# Patient Record
Sex: MALE | HISPANIC OR LATINO | URBAN - METROPOLITAN AREA
[De-identification: names, ages, dates, MRNs, and addresses within clinical notes are randomized per-mention and may not be internally consistent; named-entity substitution may affect disease eponyms.]

---

## 2020-01-13 ENCOUNTER — OFFICE VISIT (OUTPATIENT)
Dept: FAMILY MEDICINE CLINIC | Facility: CLINIC | Age: 20
End: 2020-01-13

## 2020-01-13 VITALS
RESPIRATION RATE: 16 BRPM | BODY MASS INDEX: 17.9 KG/M2 | SYSTOLIC BLOOD PRESSURE: 116 MMHG | DIASTOLIC BLOOD PRESSURE: 74 MMHG | OXYGEN SATURATION: 97 % | HEIGHT: 70 IN | TEMPERATURE: 97.6 F | WEIGHT: 125 LBS | HEART RATE: 96 BPM

## 2020-01-13 DIAGNOSIS — J10.1 INFLUENZA A: Primary | ICD-10-CM

## 2020-01-13 DIAGNOSIS — R68.89 FLU-LIKE SYMPTOMS: ICD-10-CM

## 2020-01-13 LAB
EXPIRATION DATE: 0
FLUAV AG NPH QL: POSITIVE
FLUBV AG NPH QL: NEGATIVE
INTERNAL CONTROL: ABNORMAL
Lab: 0

## 2020-01-13 PROCEDURE — 87804 INFLUENZA ASSAY W/OPTIC: CPT | Performed by: NURSE PRACTITIONER

## 2020-01-13 PROCEDURE — 99213 OFFICE O/P EST LOW 20 MIN: CPT | Performed by: NURSE PRACTITIONER

## 2020-01-13 RX ORDER — BROMPHENIRAMINE MALEATE, PSEUDOEPHEDRINE HYDROCHLORIDE, AND DEXTROMETHORPHAN HYDROBROMIDE 2; 30; 10 MG/5ML; MG/5ML; MG/5ML
10 SYRUP ORAL EVERY 6 HOURS PRN
Qty: 400 ML | Refills: 0 | Status: SHIPPED | OUTPATIENT
Start: 2020-01-13 | End: 2020-01-23

## 2020-01-13 RX ORDER — ALBUTEROL SULFATE 90 UG/1
2 AEROSOL, METERED RESPIRATORY (INHALATION) EVERY 6 HOURS PRN
Qty: 1 INHALER | Refills: 1 | Status: SHIPPED | OUTPATIENT
Start: 2020-01-13

## 2020-01-13 NOTE — PATIENT INSTRUCTIONS
Gripe en los adultos  Influenza, Adult  La gripe, también llamada “influenza”, es sarah infección viral que afecta, principalmente, las vías respiratorias. Las vías respiratorias incluyen órganos que ayudan a respirar, neyda los pulmones, la nariz y la garganta. La gripe provoca muchos síntomas similares a los del resfrío común, junto con fiebre earleen y dolor corporal.  Se transmite fácilmente de persona a persona (es contagiosa). La mejor manera de prevenir la gripe es aplicándose la vacuna contra la gripe todos los años.  ¿Cuáles son las causas?  La causa de esta afección es el virus de la influenza. Puede contraer el virus de las siguientes maneras:  · Al inhalar las gotitas que están en el aire liberadas por la tos o el estornudo de sarah persona infectada.  · Al tocar algo que estuvo expuesto al virus (fue contaminado) y luego tocarse la boca, nariz u ojos.  ¿Qué incrementa el riesgo?  Los siguientes factores pueden hacer que usted sea propenso a contraer la gripe:  · No lavarse o desinfectarse las marino con frecuencia.  · Tener contacto cercano con muchas personas jil la temporada de resfrío y gripe.  · Tocarse la boca, los ojos o la nariz sin antes lavarse ni desinfectarse las marino.  · No colocarse la vacuna anual contra la gripe.  Puede correr un mayor riesgo de tener gripe, incluso problemas graves neyda sarah infección pulmonar (neumonía), si:  · Es mayor de 65 años de edad.  · Está embarazada.  · Tiene debilitado el sistema que combate las enfermedades (sistema inmunitario). Puede tener un sistema inmunitario debilitado si:  ? Tienen VIH o síndrome de inmunodeficiencia adquirida (SIDA).  ? Está recibiendo quimioterapia.  ? Usa medicamentos que reducen (suprimen) la actividad de zamora sistema inmunitario.  · Tiene sarah enfermedad a ravinder plazo (crónica), neyda sarah enfermedad cardíaca, enfermedad renal, diabetes o enfermedad pulmonar.  · Tiene un trastorno hepático.  · Tiene mucho sobrepeso (obesidad  mórbida).  · Tiene anemia. Esta es sarah afección que afecta a los glóbulos rojos.  · Tiene asma.  ¿Cuáles son los signos o los síntomas?  Los síntomas de esta afección por lo general comienzan de repente y chew entre 4 y 14 días. Pueden incluir los siguientes:  · Fiebre y escalofríos.  · Raj de marija, raj en el cuerpo o raj musculares.  · Dolor de garganta.  · Tos.  · Secreción o congestión nasal.  · Malestar en el pecho.  · Falta de apetito.  · Debilidad o fatiga.  · Mareos.  · Náuseas o vómitos.  ¿Cómo se diagnostica?  Esta afección se puede diagnosticar en función de lo siguiente:  · Los síntomas y los antecedentes médicos.  · Un examen físico.  · Un hisopado de nariz o garganta y el análisis del líquido extraído para detectar el virus de la gripe.  ¿Cómo se trata?  Si la gripe se diagnostica de forma temprana, puede tratarse con medicamentos que pueden ayudar a reducir la gravedad de la enfermedad y reducir zamora duración (medicamentos antivirales). Estos pueden administrarse por boca (vía oral) o por vía intravenosa.  Cuidarse en zamora hogar también puede ayudar a aliviar los síntomas. El médico puede recomendarle lo siguiente:  · Tito medicamentos de venta gonzalo.  · Beber mucho líquido.  En muchos casos, la gripe desaparece clarke. Si tiene síntomas graves o complicaciones, puede tratarse en un hospital.  Siga estas indicaciones en zamora casa:  Actividad  · Descanse según sea necesario y duerma wm.  · Quédese en zamora casa y no concurra al trabajo o a la escuela neyda se lo haya indicado zamora médico. A menos que visite al médico, evite salir de zamora casa hasta que la fiebre haya desaparecido por 24 horas sin tito medicamentos.  Comida y bebida  · [place] sarah solución de rehidratación oral (oral rehydration solution, ORS). Esta es sarah bebida que se vende en farmacias y tiendas minoristas.  · Ghada suficiente líquido neyda para mantener la orina de color amarillo pálido.  · En la medida en que pueda, ghada líquidos  shaun en pequeñas cantidades. Sofia líquidos shaun, neyda agua, cubitos de hielo, jugos de fruta diluidos y bebidas deportivas bajas en calorías.  · En la medida en que pueda, consuma alimentos blandos y fáciles de digerir en pequeñas cantidades. Estos alimentos incluyen bananas, compota de manzana, arroz, wallace magras, tostadas y galletas saladas.  · Evite consumir líquidos que contengan mucha azúcar o cafeína, neyda bebidas energéticas, bebidas deportivas comunes y refrescos.  · Evite mina alcohol.  · Evite los alimentos condimentados o con alto contenido de grasa.  Indicaciones generales         · [place] los medicamentos de venta gonzalo y los recetados solamente neyda se lo haya indicado el médico.  · Use un humidificador de aire frío para agregar humedad al aire de zamora casa. [place] puede facilitar la respiración.  · Al toser o estornudar, cúbrase la boca y la nariz.  · Lávese las marino con agua y jabón frecuentemente, en especial después de toser o estornudar. Use desinfectante para marino con alcohol si no dispone de agua y jabón.  · Concurra a todas las visitas de control neyda se lo haya indicado el médico. [place] es importante.  ¿Cómo se chester?    · Colóquese la vacuna anual contra la gripe. Puede colocarse la vacuna contra la gripe a fines de verano, en otoño o en invierno. Pregúntele al médico cuándo debe colocarse la vacuna contra la gripe.  · Evite el contacto con personas que están enfermas jil la temporada de resfrío y gripe. Generalmente es jil el otoño y el invierno.  Comuníquese con un médico si:  · Tiene nuevos síntomas.  · Tiene los siguientes síntomas:  ? Dolor en el pecho.  ? Diarrea.  ? Fiebre.  · La tos empeora.  · Produce más mucosidad.  · Siente náuseas o vomita.  Solicite ayuda inmediatamente si:  · Le falta el aire o tiene dificultad para respirar.  · La piel o las uñas se tornan de un color azulado.  · Presenta dolor intenso o rigidez de felisa.  · Le duele la marija, la lobo o el oído de  forma repentina.  · No puede comer ni beber sin vomitar.  Resumen  · La gripe es sarah infección viral que afecta principalmente las vías respiratorias.  · Los síntomas de la gripe normalmente comienzan de repente y chew entre 4 y 14 días.  · Colocarse la vacuna anual contra la gripe es la mejor manera de prevenir el contagio de la gripe.  · Quédese en zamora casa y no concurra al trabajo o a la escuela neyda se lo haya indicado zamora médico. A menos que visite al médico, evite salir de zamora casa hasta que la fiebre haya desaparecido por 24 horas sin mina medicamentos.  · Concurra a todas las visitas de control neyda se lo haya indicado el médico. [place] es importante.  Esta información no tiene neyda fin reemplazar el consejo del médico. Asegúrese de hacerle al médico cualquier pregunta que tenga.  Document Released: 09/27/2006 Document Revised: 07/31/2019 Document Reviewed: 07/31/2019  Elsevier Interactive Patient Education © 2019 [employer] Inc[employer]

## 2020-01-13 NOTE — PROGRESS NOTES
Subjective   Rashi Pickett is a 19 y.o. male.   Chief Complaint   Patient presents with   • Influenza     weakness, bleeding up out of his nose, headaches, dry cough,chills sore throat in the morning      Influenza   This is a new problem. The current episode started yesterday. The problem occurs constantly. Associated symptoms include chills, congestion, coughing, fatigue, a fever, headaches, myalgias, a sore throat and swollen glands. Pertinent negatives include no abdominal pain, anorexia, arthralgias, change in bowel habit, chest pain, diaphoresis, joint swelling, nausea, neck pain, numbness, urinary symptoms or weakness. Nothing aggravates the symptoms. He has tried acetaminophen for the symptoms. The treatment provided mild relief.      Patient is here for complaints of flu like symptoms.   He speaks English. There is a certified  on staff.          The following portions of the patient's history were reviewed and updated as appropriate: allergies, current medications, past family history, past medical history, past social history, past surgical history and problem list.    Review of Systems   Constitutional: Positive for activity change, appetite change, chills, fatigue and fever. Negative for diaphoresis.   HENT: Positive for congestion, nosebleeds and sore throat.    Respiratory: Positive for cough.         Occasional wheeze   Cardiovascular: Negative.  Negative for chest pain.   Gastrointestinal: Negative.  Negative for abdominal pain, anorexia, change in bowel habit and nausea.   Musculoskeletal: Positive for myalgias. Negative for arthralgias, joint swelling and neck pain.   Allergic/Immunologic: Negative.    Neurological: Positive for headaches. Negative for weakness and numbness.   Hematological: Negative.    Psychiatric/Behavioral: Negative.      History reviewed. No pertinent surgical history.  History reviewed. No pertinent past medical history.    Objective   No Known Allergies  Visit  "Vitals  /74   Pulse 96   Temp 97.6 °F (36.4 °C)   Resp 16   Ht 177.8 cm (70\")   Wt 56.7 kg (125 lb)   SpO2 97%   BMI 17.94 kg/m²       Physical Exam   Constitutional: He is oriented to person, place, and time. He appears well-developed and well-nourished. He is cooperative. He appears ill.   HENT:   Head: Normocephalic.   Right Ear: Hearing, tympanic membrane, external ear and ear canal normal.   Left Ear: Hearing, tympanic membrane, external ear and ear canal normal.   Nose: Rhinorrhea present. Right sinus exhibits no maxillary sinus tenderness and no frontal sinus tenderness. Left sinus exhibits no maxillary sinus tenderness and no frontal sinus tenderness.   Mouth/Throat: Uvula is midline. Posterior oropharyngeal erythema present. Tonsils are 2+ on the right. Tonsils are 2+ on the left. No tonsillar exudate.   Eyes: Pupils are equal, round, and reactive to light.   Neck: No JVD present.   Cardiovascular: Normal rate, regular rhythm and normal heart sounds.   Pulmonary/Chest: Effort normal and breath sounds normal. He has no wheezes.   Abdominal: Soft.   Musculoskeletal: Normal range of motion.   Lymphadenopathy:     He has cervical adenopathy.   Neurological: He is alert and oriented to person, place, and time.   Skin: Skin is warm and dry. Capillary refill takes less than 2 seconds.   Psychiatric: He has a normal mood and affect. His behavior is normal.   Vitals reviewed.      Assessment/Plan   Rashi was seen today for influenza.    Diagnoses and all orders for this visit:    Influenza A  -     brompheniramine-pseudoephedrine-DM (BROMFED DM) 30-2-10 MG/5ML syrup; Take 10 mL by mouth Every 6 (Six) Hours As Needed for Congestion, Cough or Allergies for up to 10 days.    Flu-like symptoms  -     POCT Influenza A/B  -     albuterol sulfate  (90 Base) MCG/ACT inhaler; Inhale 2 puffs Every 6 (Six) Hours As Needed for Wheezing or Shortness of Air (or cough you cannot get under control).      POCT influenza " A/B: A Positive  B: Negative  Patient has been sick x 3 days - he is out of the window for xofluza and tamiflu.   Hx of asthma: will reorder inhaler.     Discussed adding humidifier to bedroom to help with the nose bleeds.     See influenza patient instructions.            Patient Instructions   Gripe en los adultos  Influenza, Adult  La gripe, también llamada “influenza”, es sarah infección viral que afecta, principalmente, las vías respiratorias. Las vías respiratorias incluyen órganos que ayudan a respirar, neyda los pulmones, la nariz y la garganta. La gripe provoca muchos síntomas similares a los del resfrío común, junto con fiebre earlene y dolor corporal.  Se transmite fácilmente de persona a persona (es contagiosa). La mejor manera de prevenir la gripe es aplicándose la vacuna contra la gripe todos los años.  ¿Cuáles son las causas?  La causa de esta afección es el virus de la influenza. Puede contraer el virus de las siguientes maneras:  · Al inhalar las gotitas que están en el aire liberadas por la tos o el estornudo de sarah persona infectada.  · Al tocar algo que estuvo expuesto al virus (fue contaminado) y luego tocarse la boca, nariz u ojos.  ¿Qué incrementa el riesgo?  Los siguientes factores pueden hacer que usted sea propenso a contraer la gripe:  · No lavarse o desinfectarse las marino con frecuencia.  · Tener contacto cercano con muchas personas jil la temporada de resfrío y gripe.  · Tocarse la boca, los ojos o la nariz sin antes lavarse ni desinfectarse las marino.  · No colocarse la vacuna anual contra la gripe.  Puede correr un mayor riesgo de tener gripe, incluso problemas graves neyda sarah infección pulmonar (neumonía), si:  · Es mayor de 65 años de edad.  · Está embarazada.  · Tiene debilitado el sistema que combate las enfermedades (sistema inmunitario). Puede tener un sistema inmunitario debilitado si:  ? Tienen VIH o síndrome de inmunodeficiencia adquirida (SIDA).  ? Está recibiendo  quimioterapia.  ? Usa medicamentos que reducen (suprimen) la actividad de zamora sistema inmunitario.  · Tiene sarah enfermedad a ravinder plazo (crónica), neyda sarah enfermedad cardíaca, enfermedad renal, diabetes o enfermedad pulmonar.  · Tiene un trastorno hepático.  · Tiene mucho sobrepeso (obesidad mórbida).  · Tiene anemia. Esta es sarah afección que afecta a los glóbulos rojos.  · Tiene asma.  ¿Cuáles son los signos o los síntomas?  Los síntomas de esta afección por lo general comienzan de repente y chew entre 4 y 14 días. Pueden incluir los siguientes:  · Fiebre y escalofríos.  · Raj de marija, raj en el cuerpo o raj musculares.  · Dolor de garganta.  · Tos.  · Secreción o congestión nasal.  · Malestar en el pecho.  · Falta de apetito.  · Debilidad o fatiga.  · Mareos.  · Náuseas o vómitos.  ¿Cómo se diagnostica?  Esta afección se puede diagnosticar en función de lo siguiente:  · Los síntomas y los antecedentes médicos.  · Un examen físico.  · Un hisopado de nariz o garganta y el análisis del líquido extraído para detectar el virus de la gripe.  ¿Cómo se trata?  Si la gripe se diagnostica de forma temprana, puede tratarse con medicamentos que pueden ayudar a reducir la gravedad de la enfermedad y reducir zamora duración (medicamentos antivirales). Estos pueden administrarse por boca (vía oral) o por vía intravenosa.  Cuidarse en zamora hogar también puede ayudar a aliviar los síntomas. El médico puede recomendarle lo siguiente:  · Tito medicamentos de venta gonzalo.  · Beber mucho líquido.  En muchos casos, la gripe desaparece clarke. Si tiene síntomas graves o complicaciones, puede tratarse en un hospital.  Siga estas indicaciones en zamora casa:  Actividad  · Descanse según sea necesario y duerma wm.  · Quédese en zamora casa y no concurra al trabajo o a la escuela neyda se lo haya indicado zamora médico. A menos que visite al médico, evite salir de zamora casa hasta que la fiebre haya desaparecido por 24 horas sin tito  medicamentos.  Comida y bebida  · [place] sarah solución de rehidratación oral (oral rehydration solution, ORS). Esta es sarah bebida que se vende en farmacias y tiendas minoristas.  · Sofia suficiente líquido neyda para mantener la orina de color amarillo pálido.  · En la medida en que pueda, sofia líquidos shaun en pequeñas cantidades. Sofia líquidos shaun, neyda agua, cubitos de hielo, jugos de fruta diluidos y bebidas deportivas bajas en calorías.  · En la medida en que pueda, consuma alimentos blandos y fáciles de digerir en pequeñas cantidades. Estos alimentos incluyen bananas, compota de manzana, arroz, wallace magras, tostadas y galletas saladas.  · Evite consumir líquidos que contengan mucha azúcar o cafeína, neyda bebidas energéticas, bebidas deportivas comunes y refrescos.  · Evite mina alcohol.  · Evite los alimentos condimentados o con alto contenido de grasa.  Indicaciones generales         · [place] los medicamentos de venta gonzalo y los recetados solamente neyda se lo haya indicado el médico.  · Use un humidificador de aire frío para agregar humedad al aire de zamora casa. [place] puede facilitar la respiración.  · Al toser o estornudar, cúbrase la boca y la nariz.  · Lávese las marino con agua y jabón frecuentemente, en especial después de toser o estornudar. Use desinfectante para marino con alcohol si no dispone de agua y jabón.  · Concurra a todas las visitas de control neyda se lo haya indicado el médico. [place] es importante.  ¿Cómo se chester?    · Colóquese la vacuna anual contra la gripe. Puede colocarse la vacuna contra la gripe a fines de verano, en otoño o en invierno. Pregúntele al médico cuándo debe colocarse la vacuna contra la gripe.  · Evite el contacto con personas que están enfermas jil la temporada de resfrío y gripe. Generalmente es jil el otoño y el invierno.  Comuníquese con un médico si:  · Tiene nuevos síntomas.  · Tiene los siguientes síntomas:  ? Dolor en el pecho.  ? Diarrea.  ? Fiebre.  · La  tos empeora.  · Produce más mucosidad.  · Siente náuseas o vomita.  Solicite ayuda inmediatamente si:  · Le falta el aire o tiene dificultad para respirar.  · La piel o las uñas se tornan de un color azulado.  · Presenta dolor intenso o rigidez de felisa.  · Le duele la marija, la lobo o el oído de forma repentina.  · No puede comer ni beber sin vomitar.  Resumen  · La gripe es sarah infección viral que afecta principalmente las vías respiratorias.  · Los síntomas de la gripe normalmente comienzan de repente y chew entre 4 y 14 días.  · Colocarse la vacuna anual contra la gripe es la mejor manera de prevenir el contagio de la gripe.  · Quédese en zamora casa y no concurra al trabajo o a la escuela neyda se lo haya indicado zamora médico. A menos que visite al médico, evite salir de zamora casa hasta que la fiebre haya desaparecido por 24 horas sin mina medicamentos.  · Concurra a todas las visitas de control neyda se lo haya indicado el médico. [place] es importante.  Esta información no tiene neyda fin reemplazar el consejo del médico. Asegúrese de hacerle al médico cualquier pregunta que tenga.  Document Released: 09/27/2006 Document Revised: 07/31/2019 Document Reviewed: 07/31/2019  Elsevier Interactive Patient Education © 2019 [employer] Inc.        Vashti Dowling, APRN

## 2021-03-05 ENCOUNTER — OFFICE VISIT (OUTPATIENT)
Dept: FAMILY MEDICINE CLINIC | Facility: CLINIC | Age: 21
End: 2021-03-05

## 2021-03-05 VITALS
WEIGHT: 141.6 LBS | TEMPERATURE: 98.7 F | BODY MASS INDEX: 21.46 KG/M2 | SYSTOLIC BLOOD PRESSURE: 94 MMHG | HEART RATE: 66 BPM | DIASTOLIC BLOOD PRESSURE: 60 MMHG | OXYGEN SATURATION: 98 % | HEIGHT: 68 IN

## 2021-03-05 DIAGNOSIS — G56.03 BILATERAL CARPAL TUNNEL SYNDROME: ICD-10-CM

## 2021-03-05 DIAGNOSIS — K29.70 GASTRITIS, PRESENCE OF BLEEDING UNSPECIFIED, UNSPECIFIED CHRONICITY, UNSPECIFIED GASTRITIS TYPE: ICD-10-CM

## 2021-03-05 DIAGNOSIS — Z00.00 WELL ADULT EXAM: Primary | ICD-10-CM

## 2021-03-05 PROCEDURE — 99385 PREV VISIT NEW AGE 18-39: CPT | Performed by: FAMILY MEDICINE

## 2021-03-05 NOTE — PATIENT INSTRUCTIONS
Cuidados preventivos en los hombres de 18 a 21 años de edad  Preventive Care 18-21 Years Old, Male  Los cuidados preventivos hacen referencia a las opciones en cuanto al estilo de jose raul y a las visitas al médico, las cuales pueden promover la adrienne y el bienestar. Ahora puede comenzar a consultar a un médico (de adultos) en lugar de un pediatra. Zamora atención médica ahora es zamora responsabilidad. Los cuidados preventivos en los adultos jóvenes incluyen:  · Un examen físico anual. [place] también se conoce neyda visita de control de bienestar anual.  · Exámenes dentales y oculares de manera regular.  · Vacunas.  · Estudios para detectar ciertas enfermedades.  · Opciones saludables de estilo de jose raul, neyda dieta y ejercicios.  ¿Qué puedo esperar para mi visita de cuidado preventivo?  Examen físico  El médico puede controlar lo siguiente:  · Estatura y peso. Estos datos se pueden usar para calcular el índice de masa corporal (IMC), sarah medición que indica si usted tiene un peso saludable.  · Frecuencia cardíaca y presión arterial.  · Temperatura corporal.  Asesoramiento  El médico puede hacerle preguntas sobre lo siguiente:  · Problemas médicos en el pasado y antecedentes médicos familiares.  · Consumo de tabaco, alcohol y drogas.  · Bienestar en el hogar y massimo relaciones personales.  · Acceso a hunter de ronald.  · Bienestar emocional.  · Hábitos de alimentación, ejercicio y sueño.  · Actividad sexual y adrienne sexual.  ¿Qué vacunas necesito?    Vacuna antigripal  · Se recomienda aplicarse esta vacuna todos los años.  Vacuna contra el tétanos, la difteria y la tos ferina (Tdap)  · Es posible que tenga que aplicarse un refuerzo contra el tétanos y la difteria (DT) cada 10 años.  Vacuna contra la varicela  · Es posible que tenga que aplicársela si aún no la recibió.  Vacuna contra el virus del papiloma humano (VPH)  · Si el médico se lo recomienda, puede necesitar elidia dosis a lo ravinder de 6 meses.  Vacuna contra el sarampión, la  angela y las paperas (SRP)  · Blake vez tenga que aplicarse por lo menos sarah dosis de la SRP. También es posible que necesite sarah segunda dosis.  Vacuna antimeningocócica conjugada (MenACWY)  · Se recomienda la aplicación de sarah dosis si tiene entre los 19 y los 21 años de edad, y es estudiante universitario de primer año que vive en sarah residencia estudiantil, o si tiene sarah de varias afecciones médicas. Podría también necesitar dosis de refuerzo.  Vacuna antineumocócica conjugada (PCV13)  · Puede necesitar esta vacuna si tiene determinadas enfermedades y no se vacunó anteriormente.  Vacuna antineumocócica de polisacáridos (PPSV23)  · Quizás tenga que aplicarse sarah o dos dosis si fuma o si tiene determinadas afecciones.  Vacuna contra la hepatitis A  · Es posible que necesite esta vacuna si tiene ciertas afecciones o si viaja o trabaja en lugares en los que podría estar expuesto a la hepatitis A.  Vacuna contra la hepatitis B  · Es posible que necesite esta vacuna si tiene ciertas afecciones o si viaja o trabaja en lugares en los que podría estar expuesto a la hepatitis B.  Vacuna antihaemophilus influenzae tipo B (Hib)  · Es posible que necesite esta vacuna si tiene algunos factores de riesgo.  Puede recibir las vacunas en forma de dosis individuales o en forma de dos o más vacunas juntas en la misma inyección (vacunas combinadas). Hable con zamora médico sobre los riesgos y beneficios de las vacunas combinadas.  ¿Qué pruebas necesito?  Análisis de james  · Niveles de lípidos y colesterol. Estos se pueden verificar cada 5 años, a partir de los 20 años de edad.  · Análisis de hepatitis C.  · Análisis de hepatitis B.  Pruebas de detección  · Examen de genitales para detectar cáncer testicular o hernias.  · Pruebas de enfermedades de transmisión sexual (ETS), si está en riesgo.  Otras pruebas  · Prueba cutánea de tuberculosis.  · Pruebas de la vista y la audición.  · Examen de la piel.  Siga estas instrucciones en zamora  casa:  Comida y bebida    · Siga sarah dieta que incluya frutas y verduras frescas, cereales integrales, proteínas magras y productos lácteos descremados.  · Sofia suficiente líquido neyda para mantener la orina de color amarillo pálido.  · No sofia alcohol si:  ? Zamora médico le indica no hacerlo.  ? No tiene la edad legal para beber. [place] los [place] la edad legal para beber es a partir de los 21 años.  · Si zulema alcohol:  ? Limite la cantidad que consume de 0 a 2 medidas por día.  ? Esté atento a la cantidad de alcohol que hay en las bebidas que javier. En los [place], sarha medida equivale a sarah botella de cerveza de 12 oz (355 ml), un vaso de vino de 5 oz (148 ml) o un vaso de sarah bebida alcohólica de earlene graduación de 1½ oz (44 ml).  Estilo de jose raul  · Cuídese los dientes y las encías a diario.  · Manténgase activo. Juan C al menos 30 minutos de ejercicio 5 o más días por semana.  · No consuma ningún producto que contenga nicotina o tabaco, neyda cigarrillos, cigarrillos electrónicos y tabaco de mascar. Si necesita ayuda para dejar de fumar, consulte al médico.  · No consuma drogas.  · Si es sexualmente activo, practique sexo seguro. Use un condón u otra forma de protección para prevenir las ITS (infecciones de transmisión sexual).  · Encuentre formas saludables de lidiar con el estrés tales neyda:  ? Meditación, yoga o escuchar música.  ? Lleve un diario personal.  ? Hable con sarah persona confiable.  ? Pase tiempo con amigos y familiares.  Seguridad  · Use siempre el cinturón de seguridad al conducir o viajar en un vehículo.  · No conduzca si estuvo bebiendo alcohol.  · No viaje con un conductor que ha estado bebiendo.  · No conduzca cuando esté cansado o distraído.  · No envíe mensajes de texto mientras conduce.  · Use un usha y otros equipos de protección jil las actividades deportivas.  · Si tiene hunter de ronald en zamora casa, asegúrese de seguir todos los procedimientos de seguridad correspondientes.  · Busque  ayuda si fue víctima de acoso, abuso físico o abuso sexual.  · Utilice Internet con responsabilidad para evitar peligros, neyda el acoso y los depredadores sexuales en línea.  ¿Cuándo volver?  · Acuda al médico sarah vez al año para sarah visita de control.  · Pregúntele al médico con qué frecuencia debe realizarse un control de la vista y los dientes.  · Mantenga zamora esquema de vacunación al día.  Esta información no tiene neyda fin reemplazar el consejo del médico. Asegúrese de hacerle al médico cualquier pregunta que tenga.  Document Revised: 01/10/2020 Document Reviewed: 01/10/2020  Elsevier Patient Education © 2020 [employer]

## 2021-03-05 NOTE — ASSESSMENT & PLAN NOTE
The patient is here for health maintenance visit.  Currently, the patient consumes a healthy diet and has an adequate exercise regimen.  Screening lab work is ordered.  Immunizations were reviewed today.  Advice and education was given regarding nutrition, aerobic exercise, routine dental evaluations, routine eye exams, reproductive health, cardiovascular risk reduction, sunscreen use, self skin examination (annual dermatology evaluations) and seatbelt use (general overall safety).  Further recommendations will be given if needed after lab evaluation.  Annual wellness evaluation is recommended.

## 2021-03-05 NOTE — ASSESSMENT & PLAN NOTE
Patient signs symptoms seem suspicious for carpal tunnel syndrome.  Patient was instructed instructed in supportive care measures including wearing braces at night.  If symptoms do not respond to supportive care will give referral to orthopedic surgery for further evaluation.

## 2021-03-05 NOTE — ASSESSMENT & PLAN NOTE
Patient is currently asymptomatic.  He will continue medication to completion.  His follow-up visit he will bring in the medication so we can put it in his chart.

## 2021-03-05 NOTE — PROGRESS NOTES
Rashi Duque is a 20 y.o. male who presents today to [facility] and complete annual exam.    Chief Complaint   Patient presents with   • Establish Care     nose bleeds each occ is 2-3 days worse when cold/hot outside   • Back Pain     right mid back        Mr. Rashi Duque is a generally healthy 20 year old male presenting to establish care. Patient does not have a previous PCP. Patient last saw a doctor about 3 months ago for gastritis. Patient is unsure the name of the medication, but states he's been on the medication about 6 months with no GI symptoms today. Patient states his diet is regular with no concerns at this time. Patient does not get regular exercise. Patient states he has been having trouble falling asleep for the past 2 years ago. Patient states he gets about 6 hours of sleep. Patient takes melatonin every night with moderate relief. No history of anxiety or depression. Patient states he has not had any vaccines recently. Patient denies having a pediatrician.     Patient states he's been having nose bleeds monthly. Patient states this has been going on since the age of 17. Patient states it's worse when the weather is too cold or too hot. Patient states they last for about an hour. Patient usually stops them by laying on his back and putting napkins in his nose. Patient has never had to go to the emergency room. Patient has never used saline sprays or any other nasal sprays for these symptoms.    Patient complains of back pain that began about 3-4 months ago. Patient states the pain comes and goes. Patient states the back pain in located in the mid back that is worse on the right side. Patient rates the pain 4/10. Patient denies trauma to his back. Patient has taken Aleve and Advil with mild relief. Patient complains of pain in the left knee that began a couple years ago. Patient has never injured his knee. Patient states it's worse with activity. Additionally, patient has pain in his  fingers and hands he describes as a numbness. He states he will have no strength and it's painful. Patient states this has been happening off and on for about 6 months.         Review of Systems   Constitutional: Negative for fever and unexpected weight loss.   HENT: Positive for nosebleeds. Negative for congestion, ear pain and sore throat.    Eyes: Negative for visual disturbance.   Respiratory: Negative for cough, shortness of breath and wheezing.    Cardiovascular: Negative for chest pain and palpitations.   Gastrointestinal: Negative for abdominal pain, blood in stool, constipation, diarrhea, nausea, vomiting and GERD.   Endocrine: Negative for polydipsia and polyuria.   Genitourinary: Negative for difficulty urinating.   Musculoskeletal: Positive for arthralgias and back pain. Negative for joint swelling.   Skin: Negative for rash and skin lesions.   Allergic/Immunologic: Negative for environmental allergies.   Neurological: Negative for seizures and syncope.   Hematological: Does not bruise/bleed easily.   Psychiatric/Behavioral: Negative for suicidal ideas.        PHQ-9 Depression Screening  Little interest or pleasure in doing things? 0   Feeling down, depressed, or hopeless? 0   Trouble falling or staying asleep, or sleeping too much?     Feeling tired or having little energy?     Poor appetite or overeating?     Feeling bad about yourself - or that you are a failure or have let yourself or your family down?     Trouble concentrating on things, such as reading the newspaper or watching television?     Moving or speaking so slowly that other people could have noticed? Or the opposite - being so fidgety or restless that you have been moving around a lot more than usual?     Thoughts that you would be better off dead, or of hurting yourself in some way?     PHQ-9 Total Score 0   If you checked off any problems, how difficult have these problems made it for you to do your work, take care of things at home, or  "get along with other people?         Past Medical History:   Diagnosis Date   • Gastritis         History reviewed. No pertinent surgical history.     Family History   Problem Relation Age of Onset   • No Known Problems Mother    • No Known Problems Father    • No Known Problems Sister    • No Known Problems Brother    • No Known Problems Maternal Grandmother    • Other Maternal Grandfather         unknown   • Other Paternal Grandmother         unknown   • Other Paternal Grandfather         unknown        Social History     Socioeconomic History   • Marital status: Single     Spouse name: Not on file   • Number of children: Not on file   • Years of education: Not on file   • Highest education level: Not on file   Tobacco Use   • Smoking status: Never Smoker   • Smokeless tobacco: Never Used   Substance and Sexual Activity   • Alcohol use: Yes     Alcohol/week: 5.0 standard drinks     Types: 5 Cans of beer per week   • Drug use: Not Currently     Types: Marijuana     Comment: one time   • Sexual activity: Yes     Partners: Female     Birth control/protection: None        No current outpatient medications on file prior to visit.     No current facility-administered medications on file prior to visit.        No Known Allergies     Visit Vitals  BP 94/60 (BP Location: Left arm, Patient Position: Sitting, Cuff Size: Adult)   Pulse 66   Temp 98.7 °F (37.1 °C)   Ht 172.7 cm (68\")   Wt 64.2 kg (141 lb 9.6 oz)   SpO2 98%   BMI 21.53 kg/m²      Body mass index is 21.53 kg/m².    Physical Exam  Constitutional:       General: He is not in acute distress.     Appearance: He is well-developed. He is not diaphoretic.   HENT:      Head: Atraumatic.      Nose: Nose normal. No nasal deformity, septal deviation, signs of injury, laceration, nasal tenderness, mucosal edema, congestion or rhinorrhea.      Right Nostril: No foreign body, epistaxis or septal hematoma.      Left Nostril: No foreign body, epistaxis or septal hematoma. "   Neck:      Musculoskeletal: Normal range of motion and neck supple.   Cardiovascular:      Rate and Rhythm: Normal rate and regular rhythm.      Heart sounds: Normal heart sounds. No murmur. No friction rub. No gallop.    Pulmonary:      Effort: Pulmonary effort is normal. No respiratory distress.      Breath sounds: Normal breath sounds. No wheezing or rales.   Abdominal:      General: Bowel sounds are normal. There is no distension.      Palpations: Abdomen is soft. There is no mass.      Tenderness: There is no abdominal tenderness. There is no guarding or rebound.      Hernia: No hernia is present.   Musculoskeletal: Normal range of motion.         General: No swelling, tenderness or deformity.   Skin:     General: Skin is warm and dry.   Neurological:      Mental Status: He is alert and oriented to person, place, and time.      Motor: No weakness.   Psychiatric:         Behavior: Behavior normal.          No results found for this or any previous visit.     Problems Addressed this Visit        Gastrointestinal Abdominal     Gastritis     Patient is currently asymptomatic.  He will continue medication to completion.  His follow-up visit he will bring in the medication so we can put it in his chart.            Health Encounters    Well adult exam - Primary     The patient is here for health maintenance visit.  Currently, the patient consumes a healthy diet and has an adequate exercise regimen.  Screening lab work is ordered.  Immunizations were reviewed today.  Advice and education was given regarding nutrition, aerobic exercise, routine dental evaluations, routine eye exams, reproductive health, cardiovascular risk reduction, sunscreen use, self skin examination (annual dermatology evaluations) and seatbelt use (general overall safety).  Further recommendations will be given if needed after lab evaluation.  Annual wellness evaluation is recommended.            Neuro    Bilateral carpal tunnel syndrome      Patient signs symptoms seem suspicious for carpal tunnel syndrome.  Patient was instructed instructed in supportive care measures including wearing braces at night.  If symptoms do not respond to supportive care will give referral to orthopedic surgery for further evaluation.           Diagnoses       Codes Comments    Well adult exam    -  Primary ICD-10-CM: Z00.00  ICD-9-CM: V70.0     Bilateral carpal tunnel syndrome     ICD-10-CM: G56.03  ICD-9-CM: 354.0     Gastritis, presence of bleeding unspecified, unspecified chronicity, unspecified gastritis type     ICD-10-CM: K29.70  ICD-9-CM: 535.50           Return in about 3 months (around 6/5/2021) for Follow-up carpal tunnel syndrome, nose bleeds.    Parts of this office note have been dictated by voice recognition software. Grammatical and/or spelling errors may be present.     Marlon Martin MD  3/5/2021

## 2021-03-17 ENCOUNTER — OFFICE VISIT (OUTPATIENT)
Dept: FAMILY MEDICINE CLINIC | Facility: CLINIC | Age: 21
End: 2021-03-17

## 2021-03-17 VITALS
OXYGEN SATURATION: 98 % | RESPIRATION RATE: 16 BRPM | TEMPERATURE: 98.6 F | SYSTOLIC BLOOD PRESSURE: 92 MMHG | HEART RATE: 72 BPM | HEIGHT: 68 IN | BODY MASS INDEX: 21.4 KG/M2 | WEIGHT: 141.2 LBS | DIASTOLIC BLOOD PRESSURE: 56 MMHG

## 2021-03-17 DIAGNOSIS — R55 NEAR SYNCOPE: ICD-10-CM

## 2021-03-17 DIAGNOSIS — R04.0 EPISTAXIS, RECURRENT: ICD-10-CM

## 2021-03-17 DIAGNOSIS — R00.2 PALPITATIONS: ICD-10-CM

## 2021-03-17 DIAGNOSIS — G56.03 BILATERAL CARPAL TUNNEL SYNDROME: ICD-10-CM

## 2021-03-17 DIAGNOSIS — R42 EPISODE OF DIZZINESS: Primary | ICD-10-CM

## 2021-03-17 DIAGNOSIS — R42 VERTIGO: ICD-10-CM

## 2021-03-17 PROCEDURE — 80053 COMPREHEN METABOLIC PANEL: CPT | Performed by: FAMILY MEDICINE

## 2021-03-17 PROCEDURE — 93000 ELECTROCARDIOGRAM COMPLETE: CPT | Performed by: FAMILY MEDICINE

## 2021-03-17 PROCEDURE — 99214 OFFICE O/P EST MOD 30 MIN: CPT | Performed by: FAMILY MEDICINE

## 2021-03-17 PROCEDURE — 85025 COMPLETE CBC W/AUTO DIFF WBC: CPT | Performed by: FAMILY MEDICINE

## 2021-03-17 PROCEDURE — 84443 ASSAY THYROID STIM HORMONE: CPT | Performed by: FAMILY MEDICINE

## 2021-03-17 NOTE — PATIENT INSTRUCTIONS
Vértigo  Vertigo  El vértigo es la sensación de que usted o todo lo que lo rodea se mueve cuando en realidad eso no sucede. Esta sensación puede aparecer y desaparecer en cualquier momento. El vértigo suele desaparecer solo. El vértigo puede ser peligroso si ocurre mientras está haciendo algo que podría suponer un riesgo para usted y para los demás, por ejemplo, conduciendo un automóvil u operando maquinaria.  Zamora médico le hará estudios para determinar la causa del vértigo. Los estudios también ayudarán al médico a decidir cuál es la mejor manera de tratar zamora afección.  Siga estas indicaciones en zamora casa:  Comida y bebida         · Sofia suficiente líquido neyda para mantener la orina de color amarillo pálido.  · No sofia alcohol.  Actividad  · Retome massimo actividades normales según lo indicado por el médico. Pregúntele al médico qué actividades son seguras para usted.  · Por la mañana, siéntese halima a un lado de la cama. Cuando se sienta wm, póngase lentamente de pie mientras se sostiene de algo, hasta que sepa que ha logrado el equilibrio.  · Muévase lentamente. Evite algunas posiciones o determinados movimientos repentinos de la marija y el cuerpo neyda se lo haya indicado el médico.  · Si tiene dificultad para caminar o mantener el equilibrio, use un bastón para mantener la estabilidad. Si se siente mareado o inestable, siéntese de inmediato.  · No gaby ninguna tarea que podría ponerlo en riesgo a usted o a otras personas en edilberto de tener vértigo.  · Evite agacharse si se siente mareado. En zamora casa, coloque los objetos de modo que le resulte fácil alcanzarlos sin agacharse.  · No conduzca vehículos ni opere maquinaria pesada si se siente mareado.  Indicaciones generales  · Rhododendron los medicamentos de venta gonzalo y los recetados solamente neyda se lo haya indicado el médico.  · Concurra a todas las visitas de seguimiento neyda se lo haya indicado el médico. [place] es importante.  Comuníquese con un médico si:  · Los  medicamentos no le alivian el vértigo o viviana empeora.  · Tiene fiebre.  · Zamora afección empeora o presenta síntomas nuevos.  · Denise familiares o amigos advierten cambios en zamora comportamiento.  · Las náuseas o los vómitos empeoran.  · Tiene adormecimiento o sensación de pinchazos y hormigueo en sarah parte del cuerpo.  Solicite ayuda inmediatamente si:  · Tiene dificultad para moverse o hablar.  · Está mareado todo el tiempo.  · Se desmaya.  · Presenta raj de marija intensos.  · Tiene debilidad en las marino, los brazos o las piernas.  · Presenta cambios en la audición o la visión.  · Presenta rigidez en el felisa.  · Presenta sensibilidad a la fabricio.  Resumen  · El vértigo es la sensación de que usted o todo lo que lo rodea se mueve cuando en realidad eso no sucede.  · Zamora médico le hará estudios para determinar la causa del vértigo.  · Siga las indicaciones de cuidado en el hogar. Blake vez le indiquen que evite ciertas tareas, posiciones o movimientos.  · Comuníquese con un médico si los medicamentos no alivian los síntomas o si tiene fiebre, náuseas, vómitos o cambios en el comportamiento.  · Solicite ayuda de inmediato si tiene raj de marija intensos o dificultad para hablar, o si experimenta problemas auditivos o de visión.  Esta información no tiene neyda fin reemplazar el consejo del médico. Asegúrese de hacerle al médico cualquier pregunta que tenga.  Document Revised: 01/06/2020 Document Reviewed: 01/06/2020  Elsevier Patient Education © 2021 Elsevier Inc.  Cómo realizar la maniobra de Epley  How to Perform the Epley Maneuver  La maniobra de Epley es un ejercicio que olivia los síntomas del vértigo. El vértigo es la sensación de que usted o todo lo que lo rodea se mueven cuando en realidad eso no sucede. Cuando sarah persona siente vértigo, puede tener la sensación de que la habitación da vueltas y puede tener dificultad para caminar. La maniobra de Epley se usa para un tipo de vértigo que es causado por un  depósito de calcio en sarah parte del oído interno. La maniobra implica poner la marija en distintas posiciones para ayudar a que el depósito salga de la hugh.  Puede realizar esta maniobra en zamora casa cuando tenga los síntomas de vértigo. Puede repetirla tras 24 horas si el vértigo no ha desaparecido.  Aunque la maniobra de Epley lo alivie del vértigo jil algunas semanas, es posible que los síntomas vuelvan. Esta maniobra olivia los síntomas del vértigo, flavia no los mareos.  ¿Cuáles son los riesgos?  Si se realiza de forma correcta, la maniobra de Epley se considera un procedimiento seguro. En ocasiones, puede provocar mareos o náuseas que desaparecen después de un breve período. Si tiene otros síntomas, neyda cambios en la visión, debilidad o entumecimiento, deje de realizar la maniobra y llame al médico.  Materiales necesarios:  · Sarah cama o sarah sofia.  · Sarah almohada.  Cómo hacer la maniobra de Epley         1. Siéntese en el borde de sarah cama o sarah sofia con la espalda recta y las piernas extendidas o colgando sobre el borde de la cama o la sofia.  2. Gire la marija a medias hacia el oído o el lado afectado, neyda se lo haya indicado el médico.  3. Recuéstese hacia atrás rápidamente con la marija girada hasta que se encuentre recostado sobre la espalda. Es conveniente colocar sarah almohada debajo de los hombros.  4. Mantenga esta posición jil 30 segundos neyda mínimo. Si se siente mareado o tiene síntomas de vértigo, continúe sosteniendo la posición hasta que los síntomas desaparezcan.  5. Gire la marija en dirección opuesta hasta que el oído no afectado esté orientado al suelo.  6. Mantenga esta posición jil 30 segundos neyda mínimo. Si se siente mareado o tiene síntomas de vértigo, continúe sosteniendo la posición hasta que los síntomas desaparezcan.  7. Gire todo el cuerpo hacia el mismo lado que la marija de modo que quede recostado de lado. La marija ahora estará magui mirando hacia abajo. Mantenga esta  posición jil 30 segundos neyda mínimo. Si se siente mareado o tiene síntomas de vértigo, continúe sosteniendo la posición hasta que los síntomas desaparezcan.  8. Vuelva a sentarse.  Puede repetir la maniobra tras 24 horas si el vértigo no desaparece.  Siga estas instrucciones en zamora casa:  Jil 24 horas después de realizar la maniobra de Epley:  · Mantenga la marija en posición erguida.  · Cuando se acueste para dormir o descansar, mantenga la marija levantada (elevada) con dos o más almohadas.  · Evite hacer movimientos excesivos con el felisa.  Actividad  · No conduzca vehículos ni opere maquinaria si se siente mareado.  · Después de hacer la maniobra de Epley, reanude massimo actividades normales neyda se lo haya indicado el médico. Pregúntele al médico qué actividades son seguras para usted.  Instrucciones generales  · Sofia suficiente líquido neyda para mantener la orina de color amarillo pálido.  · No sofia alcohol.  · Use los medicamentos de venta gonzalo y los recetados solamente neyda se lo haya indicado el médico.  · Concurra a todas las visitas de seguimiento neyda se lo haya indicado el médico. [place] es importante.  Prevención de los síntomas del vértigo  Pregúntele al médico si debe hacer algo en zamora casa para evitar el vértigo. El médico puede recomendarle lo siguiente:  · Mantener la marija elevada con dos o más almohadas mientras duerme.  · No dormir sobre el lado del oído afectado.  · Levantarse lentamente de la cama.  · Evitar los movimientos repentinos jil el día.  · Evitar las posiciones y los movimientos de marija extremos, neyda mirar hacia arriba o doblarse.  Comuníquese con un médico si:  · El vértigo empeora.  · Tiene otros síntomas, neyda los siguientes:  ? Náuseas.  ? Vómitos.  ? Dolor de marija.  Busque ayuda de inmediato si:  · Tiene cambios en la visión.  · Tiene dolor de marija o de felisa que es intenso o que empeora.  · No puede parar de vomitar.  · Siente un nuevo adormecimiento o  debilidad en alguna parte del cuerpo.  Resumen  · El vértigo es la sensación de que usted o todo lo que lo rodea se mueven cuando en realidad eso no sucede.  · La maniobra de Epley es un ejercicio que olivia los síntomas del vértigo.  · Si se realiza de forma correcta, la maniobra de Epley se considera un procedimiento seguro y olivia el vértigo rápidamente.  Esta información no tiene neyda fin reemplazar el consejo del médico. Asegúrese de hacerle al médico cualquier pregunta que tenga.  Document Revised: 12/30/2020 Document Reviewed: 12/30/2020  Elsevier Patient Education © 2021 Elsevier Inc.

## 2021-03-17 NOTE — ASSESSMENT & PLAN NOTE
Recurrent epistaxis which did not resolve with supportive measures.  Patient was given referral to ENT for further evaluation and treatment.

## 2021-03-17 NOTE — ASSESSMENT & PLAN NOTE
Patient did have some relief with NSAIDs and bracing but problem not completely resolved.  Patient was given referral to orthopedic surgery for further evaluation and treatment.

## 2021-03-17 NOTE — PROGRESS NOTES
"Rashi Duque is a 20 y.o. male who presents today for Dizziness (blurry vision and fell in the yard yesterday) and Nose Bleed      Patient was last seen 3/5/21 for nose bleeds and numbness attributed to carpal tunnel syndrome. Patient states he's been having nose bleeds monthly since the age of 17 that is worse when the weather is too cold or too hot, which last about an hour. Patient stops them by laying on his back and putting napkins in his nose. Patient states nothing makes it better or worse. Patient states he's had 2-3 nose bleeds in the past 10 days. Patient states \"they've gotten a little better\" and that they are not lasting as long. Patient has used \"saline powell\" with mild improvement.     Patient complains of mid back pain worse on the right that began about 4 months ago, which comes and goes rated as 4/10. Patient denies trauma to his back. Patient has taken Aleve and Advil with mild relief. Also, patient has pain in his fingers and hands he describes as a numbness. He states he will have no strength and it's painful. Patient states this has been happening off and on for about 6 months. Patient states he's noticed more tingling in his hands. Patient has tried the braces at night with mild relief.     Today, patient complains of dizziness. Yesterday, patient had blurry vision around 11am 4 hours after breakfast and fell in his yard. Patient states his vision became cloudy and blurry, then his vision went black for about 3-4 minutes. He had a nose bleed during the episode. Patient also endorses ringing in the ears, heaviness of his eyes, racing heart beat, stabbing chest pain, nausea, SOA, and some tremors.  He denies vomiting, loss of control of bladder or bowels.   Patient states his wife caught him, but he did not lose consciousness. Patient states his vision gradually returned little by little. Patient states it took about 2 more minutes for his vision to return completely. Patient states this " "happened before about a year ago on two different occasions. Patient states he drinks about 3-4 water bottles a day.            The following portions of the patient's history were reviewed and updated as appropriate: allergies, current medications, past family history, past medical history, past social history, past surgical history and problem list.    No current outpatient medications on file prior to visit.     No current facility-administered medications on file prior to visit.       No Known Allergies     Visit Vitals  BP 92/56 (BP Location: Left arm, Patient Position: Sitting, Cuff Size: Adult)   Pulse 72   Temp 98.6 °F (37 °C) (Temporal)   Resp 16   Ht 173 cm (68.11\")   Wt 64 kg (141 lb 3.2 oz)   SpO2 98%   BMI 21.40 kg/m²        Physical Exam  Constitutional:       General: He is not in acute distress.     Appearance: He is well-developed. He is not ill-appearing.   HENT:      Head: Normocephalic and atraumatic.      Right Ear: Hearing, tympanic membrane, ear canal and external ear normal. There is no impacted cerumen.      Left Ear: Hearing, tympanic membrane, ear canal and external ear normal. There is no impacted cerumen.      Nose: Nose normal.   Eyes:      General: No scleral icterus.        Right eye: No discharge.         Left eye: No discharge.      Extraocular Movements: Extraocular movements intact.      Pupils: Pupils are equal, round, and reactive to light.   Neck:      Thyroid: No thyromegaly.      Vascular: No JVD.      Trachea: No tracheal deviation.   Cardiovascular:      Rate and Rhythm: Normal rate and regular rhythm.      Heart sounds: Normal heart sounds. No murmur heard.   No friction rub. No gallop.    Pulmonary:      Effort: Pulmonary effort is normal. No respiratory distress.      Breath sounds: Normal breath sounds. No stridor. No wheezing, rhonchi or rales.   Musculoskeletal:         General: No tenderness. Normal range of motion.      Cervical back: Normal range of motion and neck " supple.   Lymphadenopathy:      Cervical: No cervical adenopathy.   Skin:     General: Skin is warm and dry.      Findings: No rash.   Neurological:      General: No focal deficit present.      Mental Status: He is alert and oriented to person, place, and time.      Cranial Nerves: No cranial nerve deficit.      Sensory: No sensory deficit.      Motor: No weakness or abnormal muscle tone.      Coordination: Coordination normal.      Deep Tendon Reflexes: Reflexes normal.      Comments: Manasa-Hallpike maneuver was negative for nystagmus but did induce vertigo.  Extraocular movements also induced vertigo but not nystagmus.   Psychiatric:         Behavior: Behavior normal.          ECG 12 Lead    Date/Time: 3/17/2021 4:17 PM  Performed by: Marlon Martin MD  Authorized by: Marlon Martin MD   Comparison: not compared with previous ECG   Rhythm: sinus bradycardia  Rate: normal  Conduction: incomplete right bundle branch block  QRS axis: normal  Other findings: non-specific ST-T wave changes    Clinical impression: abnormal EKG            No results found for this or any previous visit.     Problems Addressed this Visit        Cardiac and Vasculature    Palpitations    Relevant Orders    Ambulatory Referral to Cardiology       ENT    Epistaxis, recurrent     Recurrent epistaxis which did not resolve with supportive measures.  Patient was given referral to ENT for further evaluation and treatment.         Relevant Orders    Ambulatory Referral to ENT (Otolaryngology)       Neuro    Bilateral carpal tunnel syndrome     Patient did have some relief with NSAIDs and bracing but problem not completely resolved.  Patient was given referral to orthopedic surgery for further evaluation and treatment.         Relevant Orders    Ambulatory Referral to Orthopedic Surgery       Symptoms and Signs    Near syncope     Patient has had episodes of what he describes as dizziness but may be more accurate described as vertigo and  near syncope.  EKG showed sinus bradycardia with incomplete right bundle branch block.  Patient also describes palpitations during these events.  Patient was given referral to cardiology for further evaluation.  We will make further recommendations pending return of lab results.  Patient will follow up in 1 month for further evaluation.  Patient was instructed to go to the ED if he was to have another episode of near syncope or syncope.         Relevant Orders    CBC & Differential    Comprehensive Metabolic Panel    TSH Rfx On Abnormal To Free T4    ECG 12 Lead    Ambulatory Referral to Cardiology    Episode of dizziness - Primary    Relevant Orders    CBC & Differential    Comprehensive Metabolic Panel    TSH Rfx On Abnormal To Free T4    ECG 12 Lead      Diagnoses       Codes Comments    Episode of dizziness    -  Primary ICD-10-CM: R42  ICD-9-CM: 780.4     Near syncope     ICD-10-CM: R55  ICD-9-CM: 780.2     Bilateral carpal tunnel syndrome     ICD-10-CM: G56.03  ICD-9-CM: 354.0     Epistaxis, recurrent     ICD-10-CM: R04.0  ICD-9-CM: 784.7     Palpitations     ICD-10-CM: R00.2  ICD-9-CM: 785.1         35 minutes was spent on this patient encounter including time spent with the patient, reviewing EKG, and explaining EKG to the patient as well as developing a treatment plan.    Return in about 4 weeks (around 4/14/2021) for Follow-up vertigo.    Parts of this office note have been dictated by voice recognition software. Grammatical and/or spelling errors may be present.    Marlon Martin MD   3/17/2021

## 2021-03-17 NOTE — ASSESSMENT & PLAN NOTE
Patient has had episodes of what he describes as dizziness but may be more accurate described as vertigo and near syncope.  EKG showed sinus bradycardia with incomplete right bundle branch block.  Patient also describes palpitations during these events.  Patient was given referral to cardiology for further evaluation.  We will make further recommendations pending return of lab results.  Patient will follow up in 1 month for further evaluation.  Patient was instructed to go to the ED if he was to have another episode of near syncope or syncope.

## 2021-03-18 LAB
ALBUMIN SERPL-MCNC: 4.4 G/DL (ref 3.5–5.2)
ALBUMIN/GLOB SERPL: 1.6 G/DL
ALP SERPL-CCNC: 76 U/L (ref 39–117)
ALT SERPL W P-5'-P-CCNC: 14 U/L (ref 1–41)
ANION GAP SERPL CALCULATED.3IONS-SCNC: 8.7 MMOL/L (ref 5–15)
AST SERPL-CCNC: 24 U/L (ref 1–40)
BASOPHILS # BLD AUTO: 0.06 10*3/MM3 (ref 0–0.2)
BASOPHILS NFR BLD AUTO: 0.9 % (ref 0–1.5)
BILIRUB SERPL-MCNC: 0.6 MG/DL (ref 0–1.2)
BUN SERPL-MCNC: 12 MG/DL (ref 6–20)
BUN/CREAT SERPL: 15.2 (ref 7–25)
CALCIUM SPEC-SCNC: 9.2 MG/DL (ref 8.6–10.5)
CHLORIDE SERPL-SCNC: 102 MMOL/L (ref 98–107)
CO2 SERPL-SCNC: 28.3 MMOL/L (ref 22–29)
CREAT SERPL-MCNC: 0.79 MG/DL (ref 0.76–1.27)
DEPRECATED RDW RBC AUTO: 40.5 FL (ref 37–54)
EOSINOPHIL # BLD AUTO: 0.06 10*3/MM3 (ref 0–0.4)
EOSINOPHIL NFR BLD AUTO: 0.9 % (ref 0.3–6.2)
ERYTHROCYTE [DISTWIDTH] IN BLOOD BY AUTOMATED COUNT: 12.2 % (ref 12.3–15.4)
GFR SERPL CREATININE-BSD FRML MDRD: 125 ML/MIN/1.73
GFR SERPL CREATININE-BSD FRML MDRD: >150 ML/MIN/1.73
GLOBULIN UR ELPH-MCNC: 2.8 GM/DL
GLUCOSE SERPL-MCNC: 85 MG/DL (ref 65–99)
HCT VFR BLD AUTO: 45 % (ref 37.5–51)
HGB BLD-MCNC: 15.8 G/DL (ref 13–17.7)
IMM GRANULOCYTES # BLD AUTO: 0.01 10*3/MM3 (ref 0–0.05)
IMM GRANULOCYTES NFR BLD AUTO: 0.1 % (ref 0–0.5)
LYMPHOCYTES # BLD AUTO: 2.67 10*3/MM3 (ref 0.7–3.1)
LYMPHOCYTES NFR BLD AUTO: 38.3 % (ref 19.6–45.3)
MCH RBC QN AUTO: 32 PG (ref 26.6–33)
MCHC RBC AUTO-ENTMCNC: 35.1 G/DL (ref 31.5–35.7)
MCV RBC AUTO: 91.3 FL (ref 79–97)
MONOCYTES # BLD AUTO: 0.48 10*3/MM3 (ref 0.1–0.9)
MONOCYTES NFR BLD AUTO: 6.9 % (ref 5–12)
NEUTROPHILS NFR BLD AUTO: 3.7 10*3/MM3 (ref 1.7–7)
NEUTROPHILS NFR BLD AUTO: 52.9 % (ref 42.7–76)
NRBC BLD AUTO-RTO: 0 /100 WBC (ref 0–0.2)
PLATELET # BLD AUTO: 193 10*3/MM3 (ref 140–450)
PMV BLD AUTO: 11.7 FL (ref 6–12)
POTASSIUM SERPL-SCNC: 4.6 MMOL/L (ref 3.5–5.2)
PROT SERPL-MCNC: 7.2 G/DL (ref 6–8.5)
RBC # BLD AUTO: 4.93 10*6/MM3 (ref 4.14–5.8)
SODIUM SERPL-SCNC: 139 MMOL/L (ref 136–145)
TSH SERPL DL<=0.05 MIU/L-ACNC: 0.53 UIU/ML (ref 0.27–4.2)
WBC # BLD AUTO: 6.98 10*3/MM3 (ref 3.4–10.8)

## 2021-03-19 ENCOUNTER — TELEPHONE (OUTPATIENT)
Dept: ORTHOPEDICS | Facility: OTHER | Age: 21
End: 2021-03-19

## 2021-03-23 NOTE — TELEPHONE ENCOUNTER
SPOKE TO PATIENT IN [place], HE WOULD LIKE TO USE THE INTERPRETING IPAD THAT OUR OFFICE HAS INSTEAD OF IN-PERSON /

## 2021-03-25 ENCOUNTER — OFFICE VISIT (OUTPATIENT)
Dept: ORTHOPEDIC SURGERY | Facility: CLINIC | Age: 21
End: 2021-03-25

## 2021-03-25 VITALS
HEART RATE: 78 BPM | BODY MASS INDEX: 21.38 KG/M2 | HEIGHT: 68 IN | WEIGHT: 141.09 LBS | DIASTOLIC BLOOD PRESSURE: 80 MMHG | SYSTOLIC BLOOD PRESSURE: 140 MMHG

## 2021-03-25 DIAGNOSIS — R20.2 NUMBNESS AND TINGLING IN BOTH HANDS: ICD-10-CM

## 2021-03-25 DIAGNOSIS — R20.0 NUMBNESS AND TINGLING IN BOTH HANDS: ICD-10-CM

## 2021-03-25 DIAGNOSIS — M79.642 BILATERAL HAND PAIN: Primary | ICD-10-CM

## 2021-03-25 DIAGNOSIS — M79.641 BILATERAL HAND PAIN: Primary | ICD-10-CM

## 2021-03-25 PROCEDURE — 99203 OFFICE O/P NEW LOW 30 MIN: CPT | Performed by: PHYSICIAN ASSISTANT

## 2021-03-25 NOTE — PROGRESS NOTES
[facility] Note    Subjective     Patient: Rashi Nelson  : 2000    Primary Care Provider: Marlon Martin MD    Requesting Provider: As above    Pain of the Left Hand and Pain of the Right Hand      History    Chief Complaint: Bilateral hand pain and numbness and tingling    History of Present Illness: This is a very pleasant 20-year-old male presenting today discuss his bilateral hand pain numbness and tingling.  He denies any trauma or injury.  He is right-hand dominant.  He works on trees with a chainsaw.  He reports it is constant 6/10.  He is treated with ibuprofen.  Left is more painful than right.  Here for further evaluation.    Current Outpatient Medications on File Prior to Visit   Medication Sig Dispense Refill   • albuterol sulfate  (90 Base) MCG/ACT inhaler Inhale 2 puffs Every 6 (Six) Hours As Needed for Wheezing or Shortness of Air (or cough you cannot get under control). 1 inhaler 1     No current facility-administered medications on file prior to visit.      No Known Allergies   Past Medical History:   Diagnosis Date   • Gastritis      No past surgical history on file.  Family History   Problem Relation Age of Onset   • No Known Problems Father    • No Known Problems Sister    • No Known Problems Brother    • No Known Problems Maternal Grandmother    • Other Maternal Grandfather         unknown   • Other Paternal Grandmother         unknown   • Other Paternal Grandfather         unknown   • Thyroid disease Mother    • Thyroid disease Daughter       Social History     Socioeconomic History   • Marital status: Single     Spouse name: Not on file   • Number of children: Not on file   • Years of education: Not on file   • Highest education level: Not on file   Tobacco Use   • Smoking status: Never Smoker   • Smokeless tobacco: Never Used   Vaping Use   • Vaping Use: Never used   Substance and Sexual Activity   • Alcohol use: Yes      "Alcohol/week: 5.0 standard drinks     Types: 5 Cans of beer per week     Comment: socially   • Drug use: Not Currently     Types: Marijuana     Comment: one time   • Sexual activity: Yes     Partners: Female     Birth control/protection: None        Review of Systems   Constitutional: Negative.    HENT: Negative.    Eyes: Negative.    Respiratory: Negative.    Cardiovascular: Negative.    Gastrointestinal: Negative.    Endocrine: Negative.    Genitourinary: Negative.    Musculoskeletal: Positive for arthralgias.   Skin: Negative.    Allergic/Immunologic: Negative.    Neurological: Negative.    Hematological: Negative.    Psychiatric/Behavioral: Negative.        The following portions of the patient's history were reviewed and updated as appropriate: allergies, current medications, past family history, past medical history, past social history, past surgical history and problem list.      Objective      Physical Exam  /80   Pulse 78   Ht 173 cm (68.11\")   Wt 64 kg (141 lb 1.5 oz)   BMI 21.38 kg/m²     Body mass index is 21.38 kg/m².    GENERAL: Body habitus: normal weight for height    Gait: normal     Mental Status:  awake and alert; oriented to person, place, and time    Voice:  clear  SKIN:  Normal    Hair Growth:  Right:normal; Left:  normal  HEENT: Head: Normocephalic, atraumatic,  without obvious abnormality.  eye: normal external eye, no icterus   PULM:  Repiratory effort normal    Ortho Exam  Peripheral Vascular   Bilateral Upper Extremity    No cyanotic nail beds    Pink nail beds and rapid capillary refill   Palpation    Radial Pulse - Bilaterally normal    Neurologic   Sensory: Light touch intact- Right and left hand    Left Upper Extremity    Left wrist extensors: 5/5    Left wrist flexors: 5/5    Left intrinsics: 5/5   Right Upper Extremity    Right wrist extensors: 5/5    Right wrist flexors: 5/5    Right intrinsics: 5/5    Musculoskeletal   Left Elbow    Forearm supination: AROM - 90 " degrees    Forearm pronation: AROM - 90 degrees   Right Elbow    Forearm supination: AROM - 90 degrees    Forearm pronation: AROM - 90 degrees     Inspection and Palpation   Right Wrist      Tenderness -mild tenderness in the wrist joint    swelling - none    Crepitus - none    Muscle tone - no atrophy   Left Wrist    Tenderness -mild tenderness in the wrist joint    swelling - none    Crepitus - none    Muscle tone - no atrophy     ROJM:   Left Wrist    Flexion: AROM - 90 degrees    Extension: AROM - 90 degrees   Right Wrist    Flexion: AROM - 90 degrees    Extension: AROM - 90 degrees     Deformities, Malalignments, Discrepancies    None     Functional Testing   Right Wrist    Tinel's Sign negative    Phalen's Sign negative    Carpal Compression Test negative   Left Wrist    Tinel's Sign negative    Phalen's Sign negative    Carpal Compression Test negative       Strength and Tone    Right  strength: good    Left  strength: good     Hand Exam:        Patient is tender at the ring and index finger PIP joint bilaterally.  No swelling warmth or erythema.    Full range of motion of the thumb MCPJ and IPJ bilaterally    Full range of motion of the index finger MCPJ, PIPJ and DIPJ  bilaterally    Full range of motion of the middle finger MCPJ, PIPJ and DIPJ bilaterally    Full range of motion of the ring finger MCPJ, PIPJ and DIPJ bilaterally    Full range of motion of the small finger MCPJ, PIPJ and DIPJ bialterally    FDS, FDP and extensor tendons are intact in the index, middle, ring and small fingers bilaterally    FPJ and extensor tendons are intact in the thumb.    No palpable triggering              Medical Decision Making    Data Review:   ordered and reviewed x-rays today    Assessment:  1. Bilateral hand pain    2. Numbness and tingling in both hands        Plan:  Bilateral hand pain numbness and tingling.  I reviewed today's x-rays and clinical findings with the patient.  X-rays today show no evidence  of acute or chronic bony findings.  Patient does have tenderness at the PIP joints of the index and long ring finger bilaterally.  Sensation is intact.  He also has some mild wrist joint tenderness.  I explained to him that often numbness and tingling in the hands is secondary to nerve compression, carpal tunnel syndrome or ulnar neuropathy.  This can cause pain as well.  But this would not cause the tenderness in the joints.  Recommendation today is to get him cock-up wrist splints to sleep and at night.  He will return to see me in 1 month for repeat exam.  Should his symptoms persist we will consider following up with an EMG.    History, diagnosis and treatment plan discussed with Dr. Neal.        Stacey Pillai PA-C  03/26/21  10:53 EDT

## 2021-04-29 ENCOUNTER — OFFICE VISIT (OUTPATIENT)
Dept: ORTHOPEDIC SURGERY | Facility: CLINIC | Age: 21
End: 2021-04-29

## 2021-04-29 VITALS
HEIGHT: 68 IN | BODY MASS INDEX: 20.82 KG/M2 | WEIGHT: 137.4 LBS | DIASTOLIC BLOOD PRESSURE: 80 MMHG | HEART RATE: 82 BPM | SYSTOLIC BLOOD PRESSURE: 116 MMHG

## 2021-04-29 DIAGNOSIS — R20.2 NUMBNESS AND TINGLING IN BOTH HANDS: Primary | ICD-10-CM

## 2021-04-29 DIAGNOSIS — R20.0 NUMBNESS AND TINGLING IN BOTH HANDS: Primary | ICD-10-CM

## 2021-04-29 DIAGNOSIS — M79.641 BILATERAL HAND PAIN: ICD-10-CM

## 2021-04-29 DIAGNOSIS — M79.642 BILATERAL HAND PAIN: ICD-10-CM

## 2021-04-29 PROCEDURE — 99213 OFFICE O/P EST LOW 20 MIN: CPT | Performed by: PHYSICIAN ASSISTANT

## 2021-05-28 ENCOUNTER — TELEPHONE (OUTPATIENT)
Dept: ORTHOPEDIC SURGERY | Facility: CLINIC | Age: 21
End: 2021-05-28